# Patient Record
Sex: MALE | Race: WHITE
[De-identification: names, ages, dates, MRNs, and addresses within clinical notes are randomized per-mention and may not be internally consistent; named-entity substitution may affect disease eponyms.]

---

## 2017-09-14 ENCOUNTER — HOSPITAL ENCOUNTER (EMERGENCY)
Dept: HOSPITAL 74 - FER | Age: 37
Discharge: LEFT BEFORE DISCHARGE | End: 2017-09-14
Payer: SELF-PAY

## 2017-09-14 VITALS — TEMPERATURE: 99.5 F | HEART RATE: 95 BPM

## 2017-09-14 VITALS — BODY MASS INDEX: 29 KG/M2

## 2017-09-14 VITALS — DIASTOLIC BLOOD PRESSURE: 79 MMHG | SYSTOLIC BLOOD PRESSURE: 140 MMHG

## 2017-09-14 DIAGNOSIS — K57.20: Primary | ICD-10-CM

## 2017-09-14 DIAGNOSIS — F17.210: ICD-10-CM

## 2017-09-14 LAB
ALBUMIN SERPL-MCNC: 4.3 G/DL (ref 3.5–5)
ALP SERPL-CCNC: 64 U/L (ref 32–92)
ALT SERPL-CCNC: 18 U/L (ref 10–40)
ANION GAP SERPL CALC-SCNC: 8 MMOL/L (ref 8–16)
AST SERPL-CCNC: 21 U/L (ref 10–42)
BACTERIA #/AREA URNS HPF: (no result) /HPF
BILIRUB SERPL-MCNC: 0.4 MG/DL (ref 0.2–1)
BILIRUB UR STRIP.AUTO-MCNC: (no result) MG/DL
CALCIUM SERPL-MCNC: 9.1 MG/DL (ref 8.4–10.2)
CO2 SERPL-SCNC: 25 MMOL/L (ref 22–28)
COLOR UR: YELLOW
CREAT SERPL-MCNC: 1 MG/DL (ref 0.6–1.3)
DEPRECATED RDW RBC AUTO: 12.2 % (ref 11.9–15.9)
GLUCOSE SERPL-MCNC: 142 MG/DL (ref 74–106)
HYALINE CASTS URNS QL MICRO: (no result) /LPF
MCH RBC QN AUTO: 30.8 PG (ref 25.7–33.7)
MCHC RBC AUTO-ENTMCNC: 34.2 G/DL (ref 32–35.9)
MCV RBC: 90.2 FL (ref 80–96)
PH UR: 5.5 [PH] (ref 4.5–8)
PLATELET # BLD AUTO: 232 K/MM3 (ref 134–434)
PLATELET # BLD EST: ADEQUATE 10*3/UL
PMV BLD: 9.3 FL (ref 7.5–11.1)
PROT SERPL-MCNC: 8.3 G/DL (ref 6.4–8.3)
PROT UR QL STRIP: (no result)
RBC # BLD AUTO: (no result) /HPF (ref 0–3)
SP GR UR: >= 1.03 (ref 1–1.02)
UROBILINOGEN UR STRIP-MCNC: 1 MG/DL (ref 0.2–1)
VARIANT LYMPHS NFR BLD MANUAL: 3 % (ref 0–80)
WBC # BLD AUTO: 18.2 K/MM3 (ref 4–10.8)
WBC # UR AUTO: (no result) /UL (ref 3–5)

## 2017-09-14 PROCEDURE — 3E0337Z INTRODUCTION OF ELECTROLYTIC AND WATER BALANCE SUBSTANCE INTO PERIPHERAL VEIN, PERCUTANEOUS APPROACH: ICD-10-PCS

## 2017-09-14 PROCEDURE — 3E033NZ INTRODUCTION OF ANALGESICS, HYPNOTICS, SEDATIVES INTO PERIPHERAL VEIN, PERCUTANEOUS APPROACH: ICD-10-PCS

## 2017-09-14 PROCEDURE — 3E03329 INTRODUCTION OF OTHER ANTI-INFECTIVE INTO PERIPHERAL VEIN, PERCUTANEOUS APPROACH: ICD-10-PCS

## 2017-09-14 PROCEDURE — 3E0333Z INTRODUCTION OF ANTI-INFLAMMATORY INTO PERIPHERAL VEIN, PERCUTANEOUS APPROACH: ICD-10-PCS

## 2017-09-14 NOTE — PN
Progress Note (short form)





- Note


Progress Note: 


Surgery





37m with two days of flu symptoms who developed abd pain.  wbc 18. no fever.





ct with free air and likely perforated diverticulitis.  spoke with patient and 

mother on phone and offered exploratory surgery now at [place] with likely 

colostomy.  Mother (who is a nurse) and son both refuse surgery and want to 

attempt medical management.  Since [facility] is closed for emergency 

surgery after 4pm, patient will have to be transferred to [facility] in case 

he deteriorates.  The mother is not happy and does not want her son sent to 

[place].  "i dont mind staying here but I dont want to go to [place]"





keep npo.  recommend zosyn.  If patient deteriorates will need lapaorotomy.  Pt 

and mother assume risk of worsening sepsis and delay in diagnosis of malignancy 

(if this is not diverticulitis)  Since plans are being made for transfer will 

formally evaluate on arrival to [facility].

## 2017-09-14 NOTE — PDOC
History of Present Illness





- General


Chief Complaint: Cold Symptoms


Stated Complaint: FEVER


Time Seen by Provider: 09/14/17 09:20


History Source: Patient


Exam Limitations: No Limitations





- History of Present Illness


Initial Comments: 


09/14/17 12:39


The patient is a 37M with no PMH who presents to the ED with multiple 

complaints. The patient states that he "has the flu" with sharp pains in his 

abdomen. The patient has had the flu before and states this is exactly like it, 

with accompanying muscle aches, night sweats, fever and chills. The abdominal 

pain is diffuse, does not radiate, and "sometimes" effects his chest. Denies CP

, SOB.





Past History





- Past Medical History


Allergies/Adverse Reactions: 


 Allergies











Allergy/AdvReac Type Severity Reaction Status Date / Time


 


No Known Allergies Allergy   Verified 09/14/17 09:19











Home Medications: 


Ambulatory Orders





NK [No Known Home Medication]  09/14/17 








Other medical history: DENIES





- Psycho/Social/Smoking Cessation Hx


Anxiety: No


Suicidal Ideation: No


Smoking History: Current every day smoker


Number of Cigarettes Smoked Daily: 6


Information on smoking cessation initiated: Yes


'Breaking Loose' booklet given: 09/14/17


Hx Alcohol Use: Yes


Drug/Substance Use Hx: No


Substance Use Type: Alcohol





**Review of Systems





- Review of Systems


Able to Perform ROS?: Yes


Is the patient limited English proficient: No


Constitutional: Yes: Chills, Fever, Night Sweats


HEENTM: Yes: Throat Pain


Respiratory: No: Cough, Shortness of Breath


Cardiac (ROS): No: Chest Pain


ABD/GI: Yes: Difficulty Swallowing, Other (abd pain).  No: Constipated, Diarrhea

, Nausea, Poor Appetite, Poor Fluid Intake, Vomiting, Abdominal cramping


: No: Burning, Dysuria, Discharge, Hematuria


Neurological: No: Headache, Numbness, Tingling, Weakness





*Physical Exam





- Vital Signs


 Last Vital Signs











Temp Pulse Resp BP Pulse Ox


 


 99.5 F   95 H  18   138/84   99 


 


 09/14/17 12:28  09/14/17 12:28  09/14/17 12:28  09/14/17 12:28  09/14/17 12:28














- Physical Exam


General Appearance: Yes: Nourished, Appropriately Dressed


HEENT: positive: Normal Voice, Tonsillar Erythema, Hearing Grossly Normal.  

negative: TM Bulging, TM Dull, TM Erythema


Neck: positive: Supple.  negative: Rigid


Respiratory/Chest: positive: Lungs Clear, Normal Breath Sounds.  negative: 

Chest Tender, Respiratory Distress, Accessory Muscle Use, Rhonchi, Stridor, 

Wheezing


Cardiovascular: positive: Regular Rhythm, Regular Rate, S1, S2.  negative: 

Diastolic Murmur, Systolic Murmur


Gastrointestinal/Abdominal: positive: Tender, Soft, Tenderness (diffuse).  

negative: Guarding, Rebound


Integumentary: positive: Warm, Diaphoresis


Neurologic: positive: Fully Oriented, Alert, Normal Mood/Affect, Motor Strength 

5/5





ED Treatment Course





- LABORATORY


CBC & Chemistry Diagram: 


 09/14/17 10:25





 09/14/17 10:25





- ADDITIONAL ORDERS


Additional order review: 


 Laboratory  Results











  09/14/17 09/14/17





  10:25 10:25


 


Sodium  132 L 


 


Potassium  3.5 


 


Chloride  99 


 


Carbon Dioxide  25 


 


Anion Gap  8 


 


BUN  13 


 


Creatinine  1.0 


 


Creat Clearance w eGFR  > 60 


 


Random Glucose  142 H 


 


Calcium  9.1 


 


Total Bilirubin  0.4 


 


AST  21 


 


ALT  18 


 


Alkaline Phosphatase  64 


 


Total Protein  8.3 


 


Albumin  4.3 


 


Urine Color   Yellow


 


Urine Appearance   Clear


 


Urine pH   5.5


 


Ur Specific Gravity   >= 1.030 H


 


Urine Protein   2+ H


 


Urine Glucose (UA)   Trace


 


Urine Ketones   Trace


 


Urine Blood   Negative


 


Urine Nitrite   Negative


 


Urine Bilirubin   1+ H


 


Urine Urobilinogen   1.0


 


Urine RBC   0-3


 


Urine WBC   0-3


 


Ur Epithelial Cells   Moderate


 


Urine Bacteria   Moderate


 


Hyaline Casts   3-5




















 09/14/17 09:24 Group A Strep Rapid Antigen - Final





 Throat    NEGATIVE FOR THE ANTIGEN OF BETA HEMOLYTIC STREP GROUP A








 











  09/14/17





  10:25


 


RBC  5.42


 


MCV  90.2


 


MCHC  34.2


 


RDW  12.2


 


MPV  9.3


 


Neutrophils %  Y


 


Lymphocytes %  Y














Medical Decision Making





- Medical Decision Making


09/14/17 13:04


Patient is a 37M with flu-like symptoms and abdominal pain. CT was ordered by 

attending and shows sigmoid colon diverticulitis with gross free air. Dr. Araiza has been called and is aware of the patient. Hospitalist paged.





NPO, zosyn, IVF.





09/14/17 13:14


Spoke with Megan Kat NP and she recommends transfer to [facility]. Will 

discuss the transfer with Dr. Bernal.





09/14/17 13:23


Dr. Bernal is speaking with the family on the phone with regards to the patient

's options. Rebekah NP insists on transfer to [facility]. Face sheet will be faxed.





09/14/17 15:17


Mother does not want patient transferred to [facility] facility and wants AMA form.





Patient signed AMA form and will leave with all reports and labs and CT imaging.





I discussed with the worst case scenario, which includes death, for the 

patient. Patient agrees.





*DC/Admit/Observation/Transfer


Diagnosis at time of Disposition: 


Diverticulitis of colon with perforation


Qualifiers:


 Diverticulitis bleeding: without bleeding Qualified Code(s): K57.20 - 

Diverticulitis of large intestine with perforation and abscess without bleeding





- Discharge Dispostion


Disposition: AGAINST MEDICAL ADVICE


Condition at time of disposition: Guarded


Admit: No

## 2017-09-14 NOTE — HP
CHIEF COMPLAINT:  abdominal pain 





PCP:  "I don't have a doctor" 





HISTORY OF PRESENT ILLNESS:  Patient is a 36 y/o male with a past medical 

history of asthma (last exacerbation was 10 years ago).  patient reports lower 

abdominal pain, chills and tactile fever since yesterday evening. He reports 

generalized bodyaches with sharp lower abdominal pain today and sought 

evaluation in the emergency department.  








ER course was notable for:


(1) ct of ab/pelvis with contrast: perforated acute sigmoid diverticulitis with 

gross free air, no drainable collection


(2)wbc 18.2


(3) surgery, Dr Araiza consulted by ED physician 





Recent Travel: none





PAST MEDICAL HISTORY: asthma 





PAST SURGICAL HISTORY: adnoidectomy 





Social History:  employed  resides at  home alone


Smoking: "a couple of cigarrettes daily"


Alcohol: social


Drugs: none





Family History:  mother alive and well 


Allergies





No Known Allergies Allergy (Verified 09/14/17 09:19)


 








HOME MEDICATIONS:


 Home Medications











 Medication  Instructions  Recorded


 


NK [No Known Home Medication]  09/14/17








REVIEW OF SYSTEMS


CONSTITUTIONAL: 


Absent:  fever, chills, diaphoresis, generalized weakness, malaise, loss of 

appetite, weight change


HEENT: 


Absent:  rhinorrhea, nasal congestion, throat pain, throat swelling, difficulty 

swallowing, mouth swelling, ear pain, eye pain, visual changes


CARDIOVASCULAR: 


Absent: chest pain, syncope, palpitations, irregular heart rate, lightheadedness

, peripheral edema


RESPIRATORY: 


Absent: cough, shortness of breath, dyspnea with exertion, orthopnea, wheezing, 

stridor, hemoptysis


GASTROINTESTINAL:


present: abdominal pain


Absent:  abdominal distension, nausea, vomiting, diarrhea, constipation, melena

, hematochezia


GENITOURINARY: 


Absent: dysuria, frequency, urgency, hesitancy, hematuria, flank pain, genital 

pain


MUSCULOSKELETAL: 


Absent: myalgia, arthralgia, joint swelling, back pain, neck pain


SKIN: 


Absent: rash, itching, pallor


HEMATOLOGIC/IMMUNOLOGIC: 


Absent: easy bleeding, easy bruising, lymphadenopathy, frequent infections


ENDOCRINE:


Absent: unexplained weight gain, unexplained weight loss, heat intolerance, 

cold intolerance


NEUROLOGIC: 


Absent: headache, focal weakness or paresthesias, dizziness, unsteady gait, 

seizure, mental status changes, bladder or bowel incontinence


PSYCHIATRIC: 


Absent: anxiety, depression, suicidal or homicidal ideation, hallucinations.








PHYSICAL EXAMINATION


 Vital Signs - 24 hr











  09/14/17 09/14/17





  09:18 12:28


 


Temperature 99.4 F 99.5 F


 


Pulse Rate 97 H 


 


Pulse Rate [  95 H





Right]  


 


Respiratory 18 18





Rate  


 


Blood Pressure 140/79 


 


Blood Pressure  138/84





[Left Arm]  


 


O2 Sat by Pulse 98 99





Oximetry (%)  











GENERAL: Awake, alert, and fully oriented, in no acute distress.


HEAD: Normal with no signs of trauma.


EYES: Pupils equal, round and reactive to light, extraocular movements intact, 

sclera anicteric, conjunctiva clear. No lid lag.


EARS, NOSE, THROAT: Ears normal, nares patent, oropharynx clear without 

exudates. Moist mucous membranes.


NECK: Normal range of motion, supple without lymphadenopathy, JVD, or masses.


LUNGS: Breath sounds equal, clear to auscultation bilaterally. No wheezes, and 

no crackles. No accessory muscle use.


HEART: Regular rate and rhythm, normal S1 and S2 without murmur, rub or gallop.


ABDOMEN: Soft, diffuse abdominal pain tenderness, hypoactive bowel sounds, not 

distended,  no guarding, no rebound, no masses.  No hepatomegaly or  

splenomegaly. 


MUSCULOSKELETAL: Normal range of motion at all joints. No bony deformities or 

tenderness. No CVA tenderness.


UPPER EXTREMITIES: 2+ pulses, warm, well-perfused. No cyanosis. No clubbing. No 

peripheral edema.


LOWER EXTREMITIES: 2+ pulses, warm, well-perfused. No calf tenderness. No 

peripheral edema. 


NEUROLOGICAL:  Cranial nerves II-XII intact. Normal speech. Normal gait.


PSYCHIATRIC: Cooperative. Good eye contact. Appropriate mood and affect.


SKIN: Warm, dry, normal turgor, no rashes or lesions noted, normal capillary 

refill. 


 Laboratory Results - last 24 hr











  09/14/17 09/14/17 09/14/17





  10:25 10:25 10:25


 


WBC  18.2 H  


 


RBC  5.42  


 


Hgb  16.7  


 


Hct  48.9  


 


MCV  90.2  


 


MCH  30.8  


 


MCHC  34.2  


 


RDW  12.2  


 


Plt Count  232  


 


MPV  9.3  


 


Neutrophils %  Y  


 


Neutrophils % (Manual)  69  


 


Band Neuts % (Manual)  9  


 


Lymphocytes %  Y  


 


Lymphocytes % (Manual)  15  


 


Monocytes % (Manual)  4  


 


Platelet Estimate  Adequate  


 


Sodium    132 L


 


Potassium    3.5


 


Chloride    99


 


Carbon Dioxide    25


 


Anion Gap    8


 


BUN    13


 


Creatinine    1.0


 


Creat Clearance w eGFR    > 60


 


Random Glucose    142 H


 


Calcium    9.1


 


Total Bilirubin    0.4


 


AST    21


 


ALT    18


 


Alkaline Phosphatase    64


 


Total Protein    8.3


 


Albumin    4.3


 


Urine Color   Yellow 


 


Urine Appearance   Clear 


 


Urine pH   5.5 


 


Ur Specific Gravity   >= 1.030 H 


 


Urine Protein   2+ H 


 


Urine Glucose (UA)   Trace 


 


Urine Ketones   Trace 


 


Urine Blood   Negative 


 


Urine Nitrite   Negative 


 


Urine Bilirubin   1+ H 


 


Urine Urobilinogen   1.0 


 


Urine RBC   0-3 


 


Urine WBC   0-3 


 


Ur Epithelial Cells   Moderate 


 


Urine Bacteria   Moderate 


 


Hyaline Casts   3-5 











ASSESSMENT/PLAN:








F/E/N


- npo-->ivf


- replete lyes prn





ppx


- pepcid


- hold ac for pending or 





dispo: lengthy conversation with patient and mother, both patient and mother 

declines transfer to [facility], patient made aware if his condition 

detoriates he will require emergent intervention that is not available at [place] after 4pm and as a result his care will be delayed which may result in 

death, patient and mother verbalize understanding and admantly decline transfer 

to [facility]. 





patient t requires inpatient admission 











Problem List





- Problem


(1) Perforation of sigmoid colon due to diverticulitis


Assessment/Plan: 


- continue zosyn, appreciate ID input for abx clearence


- npo-->ivf 


- lengthy conversation with patient,  requesting to defer surgery at this time 

and wishes to continue IV abx for the next 12 hours and re-evaluate. 


- Dr Araiza, general surgeon consulted and following





Code(s): K57.20 - DVTRCLI OF LG INT W PERFORATION AND ABSCESS W/O BLEEDING





(2) Asthma


Code(s): J45.909 - UNSPECIFIED ASTHMA, UNCOMPLICATED   








Visit type





- Emergency Visit


Emergency Visit: Yes


Care time: The patient presented to the Emergency Department on the above date 

and was hospitalized for further evaluation of their emergent condition.





- New Patient


This patient is new to me today: Yes


Date on this admission: 09/14/17





- Critical Care


Critical Care patient: No

## 2017-09-15 NOTE — PDOC
Attending Attestation





- Resident


Resident Name: Foster Dorantes





- ED Attending Attestation


I have performed the following: I have examined & evaluated the patient, The 

case was reviewed & discussed with the resident, I agree w/resident's findings 

& plan, Exceptions are as noted





- HPI


HPI: 


36 y/o male walked in along with his mother complaining of fever and sore 

throat of new onset.


At the initial presentation denied any other complaints.


During physical examination he recalled having abdominal pain, mostly diffuse.





09/15/17 11:59








- Physicial Exam


PE: 


Alert, oriented x3, mildly anxious, answering questions after interacting with 

his mother present here.


Uncomfortable with mild to moderate distress.


Head LICHA, hyperemic pharynx, minimally enlarged latero-cervical lymph nodes, 

neck supple, lings clear, heart S1 S2.


Abdomen exquisite tenderness at deep palpation in the left lower quadrant.


No bruits, no masses felt 





09/15/17 12:02








- Critical Care Time


Total Critical Care Time: 30 (base on my PE agressive dg & treatment planned)


Critical Care Statement: The care of this patient involved high complexity 

decision making to prevent further life threatening deterioration of the patient

's condition and/or to evaluate & treat vital organ system(s) failure or risk 

of failure.





- Medical Decision Making


After physical exam, discussing with patient, his mother and interacting with 

the ER resident, suspicion of serious intra abdomianl process, diverticulitis, 

abscess were raised.


Blood work Imaging ordered. Fever and pain control done, iv fluids, atbc 

ordered 


Impression diverticular abscess/perforation. Advised inpatient admission


Surgical attending dr Araiza contacted.


He discussed with patient, his mother.


Since the potential serious complications to occur, the NP Hospitalist and 

surgical attending advised patient to be admitted at the [facility].


The patient and his mother refused if they couldn't be admitted at [facility].


We attempted to convince them, Pat the Nurse Manager involved.


They choose to sign AMA, all information were handled they will go to [facility].


Patient looked stable, alert to be transported with his mother' car 








09/15/17 12:08

## 2017-09-18 NOTE — DS
Physical Exam: 


SUBJECTIVE: Patient seen and examined, mother at bedside, requesting immediate 

discharge, patient and mother decline transfer to [facility] for further 

management.  Patient are requesting to be discharge AMA. 








OBJECTIVE: Patient is a 38 y/o male with a past medical history of asthma (last 

exacerbation was 10 years ago).  patient reports lower abdominal pain, chills 

and tactile fever since yesterday evening. He reports generalized bodyaches 

with sharp lower abdominal pain today and sought evaluation in the emergency 

department.  








ER course was notable for:


(1) ct of ab/pelvis with contrast: perforated acute sigmoid diverticulitis with 

gross free air, no drainable collection


(2)wbc 18.2


(3) surgery, Dr Araiza consulted by ED physician 





PHYSICAL EXAM





GENERAL: Awake, alert, and fully oriented, in no acute distress.


HEAD: Normal with no signs of trauma.


EYES: Pupils equal, round and reactive to light, extraocular movements intact, 

sclera anicteric, conjunctiva clear. No lid lag.


EARS, NOSE, THROAT: Ears normal, nares patent, oropharynx clear without 

exudates. Moist mucous membranes.


NECK: Normal range of motion, supple without lymphadenopathy, JVD, or masses.


LUNGS: Breath sounds equal, clear to auscultation bilaterally. No wheezes, and 

no crackles. No accessory muscle use.


HEART: Regular rate and rhythm, normal S1 and S2 without murmur, rub or gallop.


ABDOMEN: Soft, diffuse abdominal pain tenderness, hypoactive bowel sounds, not 

distended,  no guarding, no rebound, no masses.  No hepatomegaly or  

splenomegaly. 


MUSCULOSKELETAL: Normal range of motion at all joints. No bony deformities or 

tenderness. No CVA tenderness.


UPPER EXTREMITIES: 2+ pulses, warm, well-perfused. No cyanosis. No clubbing. No 

peripheral edema.


LOWER EXTREMITIES: 2+ pulses, warm, well-perfused. No calf tenderness. No 

peripheral edema. 


NEUROLOGICAL:  Cranial nerves II-XII intact. Normal speech. Normal gait.


PSYCHIATRIC: Cooperative. Good eye contact. Appropriate mood and affect.


SKIN: Warm, dry, normal turgor, no rashes or lesions noted, normal capillary 

refill. 





LABS





 CBC











WBC  18.2 K/mm3 (4.0-10.8)  H  09/14/17  10:25    


 


RBC  5.42 M/mm3 (4.00-5.60)   09/14/17  10:25    


 


Hgb  16.7 GM/dl (11.7-16.9)   09/14/17  10:25    


 


Hct  48.9 % (35.4-49)   09/14/17  10:25    


 


MCV  90.2 fl (80-96)   09/14/17  10:25    


 


MCH  30.8 pg (25.7-33.7)   09/14/17  10:25    


 


MCHC  34.2 g/dl (32.0-35.9)   09/14/17  10:25    


 


RDW  12.2 % (11.9-15.9)   09/14/17  10:25    


 


Plt Count  232 K/MM3 (134-434)   09/14/17  10:25    


 


MPV  9.3 fl (7.5-11.1)   09/14/17  10:25    


 


Neutrophils %  Y   09/14/17  10:25    


 


Neutrophils % (Manual)  69 % (42.8-82.8)   09/14/17  10:25    


 


Band Neuts % (Manual)  9 % (0-10)   09/14/17  10:25    


 


Lymphocytes %  Y   09/14/17  10:25    


 


Lymphocytes % (Manual)  15 % (8-40)   09/14/17  10:25    


 


Monocytes % (Manual)  4 % (3.8-10.2)   09/14/17  10:25    


 


Platelet Estimate  Adequate  (NORMAL)   09/14/17  10:25    








 CMP











Sodium  132 mmol/L (136-145)  L  09/14/17  10:25    


 


Potassium  3.5 mmol/L (3.5-5.1)   09/14/17  10:25    


 


Chloride  99 mmol/L ()   09/14/17  10:25    


 


Carbon Dioxide  25 mmol/L (22-28)   09/14/17  10:25    


 


Anion Gap  8  (8-16)   09/14/17  10:25    


 


BUN  13 mg/dl (7-18)   09/14/17  10:25    


 


Creatinine  1.0 mg/dl (0.6-1.3)   09/14/17  10:25    


 


Creat Clearance w eGFR  > 60  (>60)   09/14/17  10:25    


 


Random Glucose  142 mg/dl ()  H  09/14/17  10:25    


 


Lactic Acid  0.9 mmol/L (0.4-2.0)   09/14/17  14:03    


 


Calcium  9.1 mg/dl (8.4-10.2)   09/14/17  10:25    


 


Total Bilirubin  0.4 mg/dl (0.2-1.0)   09/14/17  10:25    


 


AST  21 U/L (10-42)   09/14/17  10:25    


 


ALT  18 U/L (10-40)   09/14/17  10:25    


 


Alkaline Phosphatase  64 U/L (32-92)   09/14/17  10:25    


 


Total Protein  8.3 g/dl (6.4-8.3)   09/14/17  10:25    


 


Albumin  4.3 g/dl (3.5-5.0)   09/14/17  10:25    











HOSPITAL COURSE:





* admitted from the emergency department for perforation of sigmoid colon due 

to diverticulitis. patient was treated with zosyn. patient was kept npo 

throughout admission. Lengthy conversation with patient,  requesting to defer 

surgery at this time and wishes to continue IV abx for the next 12 hours and re-

evaluate.  Dr Araiza, general surgeon consulted and following. 








PLAN





The patient insists on leaving the Hospital and is signing out against medical 

advice.  





Care being refused: transfer to Critical access hospital. 





The patient understands the risks and complications that may result from the 

refusal of medical care which may include death and permanent disability.  The 

patient has the mental capacity of understanding the risks of refusing care and 

is capable of making an informed decision.  





The patient was instructed to return to the Emergency Department should he 

change his mind regarding medical care or should his/her condition worsen.





The patient signed the Against Medical Advice form.








 Discharge: 09/18/17











Minutes to complete discharge: 45





Discharge Summary


Reason For Visit: FEVER


Condition: Guarded





- Instructions


Disposition: AGAINST MEDICAL ADVICE





- Home Medications


Comprehensive Discharge Medication List: 


Ambulatory Orders





NK [No Known Home Medication]  09/14/17 











Problem List





- Problems


(1) Perforation of sigmoid colon due to diverticulitis


Code(s): K57.20 - DVTRCLI OF LG INT W PERFORATION AND ABSCESS W/O BLEEDING





(2) Asthma


Code(s): J45.909 - UNSPECIFIED ASTHMA, UNCOMPLICATED   





This patient is new to me today: No


Emergency Visit: Yes


Care time: The patient presented to the Emergency Department on the above date 

and was hospitalized for further evaluation of their emergent condition.


Critical Care patient: No





- Discharge Referral


Referred to [facility] No

## 2019-01-28 ENCOUNTER — TRANSCRIPTION ENCOUNTER (OUTPATIENT)
Age: 39
End: 2019-01-28

## 2020-05-19 ENCOUNTER — TRANSCRIPTION ENCOUNTER (OUTPATIENT)
Age: 40
End: 2020-05-19

## 2022-10-14 PROBLEM — Z00.00 ENCOUNTER FOR PREVENTIVE HEALTH EXAMINATION: Status: ACTIVE | Noted: 2022-10-14

## 2022-11-08 ENCOUNTER — APPOINTMENT (OUTPATIENT)
Dept: GASTROENTEROLOGY | Facility: CLINIC | Age: 42
End: 2022-11-08